# Patient Record
Sex: MALE | Race: BLACK OR AFRICAN AMERICAN | NOT HISPANIC OR LATINO | Employment: FULL TIME | URBAN - METROPOLITAN AREA
[De-identification: names, ages, dates, MRNs, and addresses within clinical notes are randomized per-mention and may not be internally consistent; named-entity substitution may affect disease eponyms.]

---

## 2019-01-02 ENCOUNTER — TRANSCRIBE ORDERS (OUTPATIENT)
Dept: REGISTRATION | Facility: HOSPITAL | Age: 64
End: 2019-01-02

## 2019-01-02 ENCOUNTER — HOSPITAL ENCOUNTER (OUTPATIENT)
Dept: RADIOLOGY | Facility: HOSPITAL | Age: 64
Discharge: HOME | End: 2019-01-02
Attending: OTOLARYNGOLOGY
Payer: COMMERCIAL

## 2019-01-02 ENCOUNTER — HOSPITAL ENCOUNTER (OUTPATIENT)
Dept: CARDIOLOGY | Facility: HOSPITAL | Age: 64
Discharge: HOME | End: 2019-01-02
Attending: OTOLARYNGOLOGY
Payer: COMMERCIAL

## 2019-01-02 DIAGNOSIS — J32.4 CHRONIC PANSINUSITIS: ICD-10-CM

## 2019-01-02 DIAGNOSIS — J32.4 CHRONIC PANSINUSITIS: Primary | ICD-10-CM

## 2019-01-02 DIAGNOSIS — J32.2 CHRONIC ETHMOIDAL SINUSITIS: ICD-10-CM

## 2019-01-02 PROCEDURE — 70486 CT MAXILLOFACIAL W/O DYE: CPT

## 2019-01-02 PROCEDURE — 93005 ELECTROCARDIOGRAM TRACING: CPT

## 2019-01-03 LAB
ATRIAL RATE: 77
P AXIS: 55
PR INTERVAL: 178
QRS DURATION: 140
QT INTERVAL: 424
QTC CALCULATION(BAZETT): 479
R AXIS: 37
T WAVE AXIS: 38
VENTRICULAR RATE: 77

## 2019-01-15 NOTE — H&P
General Surgery History and Physical      HPI     Patient is a 63 y.o. male who presents with a history of chronic sinus disease with polyps.  He had sinus surgery 20 years ago which was successful until 6 months he is unable to breathe has lost his sense of smell and has nasal obstruction.  He has had recurrent infections.  He has a history of allergic rhinitis and had immunotherapy in the past.  Exam shows severe polyps completely filling the nasal cavity on both sides.  Sinus CT scan show pansinusitis.    Medical History:   Past Medical History:   Diagnosis Date   • Acid reflux    • Arthritis     R wrist   • Back pain     Lumbar spine   • BMI 36.0-36.9,adult    • BPH (benign prostatic hyperplasia)    • Bronchitis    • Concussion    • Depression    • HNP (herniated nucleus pulposus), lumbar    • Hypertension    • Lipid disorder    • Pneumonia    • Snores    • Type 2 diabetes mellitus (CMS/HCC) ([facility])        Surgical History:   Past Surgical History:   Procedure Laterality Date   • ANKLE SURGERY Bilateral    • COLONOSCOPY     • SINUS SURGERY     • WISDOM TOOTH EXTRACTION         Social History:   Social History     Social History Narrative   • No narrative on file       Family History: No family history on file.    Allergies: No known allergies    Home Medications:      Current Medications:  •  amLODIPine  •  aspirin  •  atorvastatin  •  empagliflozin  •  glipiZIDE  •  metFORMIN  •  tamsulosin  •  valsartan-hydrochlorothiazide    Review of Systems   Constitutional: Negative for appetite change.   HENT: Negative for facial swelling.    Eyes: Negative for discharge.   Respiratory: Negative for stridor.    Cardiovascular: Negative for chest pain.   Gastrointestinal: Negative for diarrhea.   Genitourinary: Negative for difficulty urinating.   Skin: Negative for rash.   Neurological: Negative for speech difficulty.   Psychiatric/Behavioral: Negative for hallucinations.     Objective     Vital Signs for the last 24  hours:       Physical Exam:  There were no vitals taken for this visit.    General appearance: alert, appears stated age and cooperative  Head: normocephalic, without obvious abnormality, atraumatic  Ears: normal TM's and external ear canals both ears  Nose: External nose with no gross deformity. Nares normal. No nasal septal hematoma or perforation. Mucosa normal. No drainage or sinus tenderness.  Throat: lips, mucosa, and tongue normal; teeth and gums normal  Neck: no adenopathy, no carotid bruit, no JVD, supple, symmetrical, trachea midline and thyroid not enlarged, symmetric, no tenderness/mass/nodules  Lungs: no wheezing or stridor  Extremities: extremities normal, warm and well-perfused; no cyanosis, clubbing, or edema  Skin: Skin color, texture, turgor normal. No rashes or lesions  Lymph nodes: No Cervical Lymphadenopathy  Neurologic: Grossly normal      Labs  I have reviewed the patient's labs.  Current labs are within normal limits.    Imaging  Significant findings include: Chronic sinusitis    Assessment/Plan He is a 63-year-old male with chronic polypoid pansinusitis status post sinus surgery 20 years ago.  He is scheduled for endoscopic sinus surgery addressing all of the sinuses including the use of the fusion image guided system.  He is being pretreated with Bactrim.  He has diabetes.

## 2019-01-18 ENCOUNTER — ANESTHESIA EVENT (OUTPATIENT)
Dept: OPERATING ROOM | Facility: HOSPITAL | Age: 64
Setting detail: HOSPITAL OUTPATIENT SURGERY
End: 2019-01-18
Payer: COMMERCIAL

## 2019-01-21 ENCOUNTER — HOSPITAL ENCOUNTER (OUTPATIENT)
Facility: HOSPITAL | Age: 64
Setting detail: HOSPITAL OUTPATIENT SURGERY
Discharge: HOME | End: 2019-01-21
Attending: OTOLARYNGOLOGY | Admitting: OTOLARYNGOLOGY
Payer: COMMERCIAL

## 2019-01-21 ENCOUNTER — ANESTHESIA (OUTPATIENT)
Dept: OPERATING ROOM | Facility: HOSPITAL | Age: 64
Setting detail: HOSPITAL OUTPATIENT SURGERY
End: 2019-01-21
Payer: COMMERCIAL

## 2019-01-21 VITALS
OXYGEN SATURATION: 98 % | HEIGHT: 69 IN | WEIGHT: 250 LBS | TEMPERATURE: 98.1 F | SYSTOLIC BLOOD PRESSURE: 148 MMHG | BODY MASS INDEX: 37.03 KG/M2 | HEART RATE: 98 BPM | RESPIRATION RATE: 18 BRPM | DIASTOLIC BLOOD PRESSURE: 78 MMHG

## 2019-01-21 LAB
GLUCOSE BLD-MCNC: 91 MG/DL (ref 70–99)
POCT TEST: NORMAL

## 2019-01-21 PROCEDURE — 0NBG4ZZ EXCISION OF LEFT ETHMOID BONE, PERCUTANEOUS ENDOSCOPIC APPROACH: ICD-10-PCS | Performed by: OTOLARYNGOLOGY

## 2019-01-21 PROCEDURE — 0NBF4ZZ EXCISION OF RIGHT ETHMOID BONE, PERCUTANEOUS ENDOSCOPIC APPROACH: ICD-10-PCS | Performed by: OTOLARYNGOLOGY

## 2019-01-21 PROCEDURE — 25800000 HC PHARMACY IV SOLUTIONS: Performed by: OTOLARYNGOLOGY

## 2019-01-21 PROCEDURE — 63600000 HC DRUGS/DETAIL CODE: Performed by: NURSE ANESTHETIST, CERTIFIED REGISTERED

## 2019-01-21 PROCEDURE — 25000000 HC PHARMACY GENERAL: Performed by: OTOLARYNGOLOGY

## 2019-01-21 PROCEDURE — 71000002 HC PACU PHASE 2 INITIAL 30MIN: Performed by: OTOLARYNGOLOGY

## 2019-01-21 PROCEDURE — 37000001 HC ANESTHESIA GENERAL: Performed by: OTOLARYNGOLOGY

## 2019-01-21 PROCEDURE — 099U4ZZ DRAINAGE OF RIGHT ETHMOID SINUS, PERCUTANEOUS ENDOSCOPIC APPROACH: ICD-10-PCS | Performed by: OTOLARYNGOLOGY

## 2019-01-21 PROCEDURE — 71000011 HC PACU PHASE 1 EA ADDL MIN: Performed by: OTOLARYNGOLOGY

## 2019-01-21 PROCEDURE — 63600000 HC DRUGS/DETAIL CODE: Performed by: OTOLARYNGOLOGY

## 2019-01-21 PROCEDURE — 71000012 HC PACU PHASE 2 EA ADDL MIN: Performed by: OTOLARYNGOLOGY

## 2019-01-21 PROCEDURE — 36000014 HC OR LEVEL 4 EA ADDL MIN: Performed by: OTOLARYNGOLOGY

## 2019-01-21 PROCEDURE — 71000001 HC PACU PHASE 1 INITIAL 30MIN: Performed by: OTOLARYNGOLOGY

## 2019-01-21 PROCEDURE — 63700000 HC SELF-ADMINISTRABLE DRUG: Performed by: OTOLARYNGOLOGY

## 2019-01-21 PROCEDURE — 63600000 HC DRUGS/DETAIL CODE: Performed by: ANESTHESIOLOGY

## 2019-01-21 PROCEDURE — 09BS4ZZ EXCISION OF RIGHT FRONTAL SINUS, PERCUTANEOUS ENDOSCOPIC APPROACH: ICD-10-PCS | Performed by: OTOLARYNGOLOGY

## 2019-01-21 PROCEDURE — 09BW8ZZ EXCISION OF RIGHT SPHENOID SINUS, VIA NATURAL OR ARTIFICIAL OPENING ENDOSCOPIC: ICD-10-PCS | Performed by: OTOLARYNGOLOGY

## 2019-01-21 PROCEDURE — 09BT4ZZ EXCISION OF LEFT FRONTAL SINUS, PERCUTANEOUS ENDOSCOPIC APPROACH: ICD-10-PCS | Performed by: OTOLARYNGOLOGY

## 2019-01-21 PROCEDURE — 099V4ZZ DRAINAGE OF LEFT ETHMOID SINUS, PERCUTANEOUS ENDOSCOPIC APPROACH: ICD-10-PCS | Performed by: OTOLARYNGOLOGY

## 2019-01-21 PROCEDURE — 27200000 HC STERILE SUPPLY: Performed by: OTOLARYNGOLOGY

## 2019-01-21 PROCEDURE — 36000004 HC OR LEVEL 4 INITIAL 30MIN: Performed by: OTOLARYNGOLOGY

## 2019-01-21 PROCEDURE — 8E09XBZ COMPUTER ASSISTED PROCEDURE OF HEAD AND NECK REGION: ICD-10-PCS | Performed by: OTOLARYNGOLOGY

## 2019-01-21 PROCEDURE — 25000000 HC PHARMACY GENERAL: Performed by: NURSE ANESTHETIST, CERTIFIED REGISTERED

## 2019-01-21 RX ORDER — PROPOFOL 10 MG/ML
INJECTION, EMULSION INTRAVENOUS AS NEEDED
Status: DISCONTINUED | OUTPATIENT
Start: 2019-01-21 | End: 2019-01-21 | Stop reason: SURG

## 2019-01-21 RX ORDER — ONDANSETRON HYDROCHLORIDE 2 MG/ML
INJECTION, SOLUTION INTRAVENOUS AS NEEDED
Status: DISCONTINUED | OUTPATIENT
Start: 2019-01-21 | End: 2019-01-21 | Stop reason: SURG

## 2019-01-21 RX ORDER — HYDRALAZINE HYDROCHLORIDE 20 MG/ML
INJECTION INTRAMUSCULAR; INTRAVENOUS AS NEEDED
Status: DISCONTINUED | OUTPATIENT
Start: 2019-01-21 | End: 2019-01-21 | Stop reason: SURG

## 2019-01-21 RX ORDER — NEOSTIGMINE METHYLSULFATE 1 MG/ML
INJECTION INTRAVENOUS AS NEEDED
Status: DISCONTINUED | OUTPATIENT
Start: 2019-01-21 | End: 2019-01-21 | Stop reason: SURG

## 2019-01-21 RX ORDER — LIDOCAINE HYDROCHLORIDE AND EPINEPHRINE 10; 10 UG/ML; MG/ML
INJECTION, SOLUTION INFILTRATION; PERINEURAL AS NEEDED
Status: DISCONTINUED | OUTPATIENT
Start: 2019-01-21 | End: 2019-01-21 | Stop reason: HOSPADM

## 2019-01-21 RX ORDER — HYDROMORPHONE HYDROCHLORIDE 1 MG/ML
0.5 INJECTION, SOLUTION INTRAMUSCULAR; INTRAVENOUS; SUBCUTANEOUS
Status: DISCONTINUED | OUTPATIENT
Start: 2019-01-21 | End: 2019-01-21 | Stop reason: HOSPADM

## 2019-01-21 RX ORDER — LIDOCAINE HYDROCHLORIDE 10 MG/ML
INJECTION, SOLUTION EPIDURAL; INFILTRATION; INTRACAUDAL; PERINEURAL AS NEEDED
Status: DISCONTINUED | OUTPATIENT
Start: 2019-01-21 | End: 2019-01-21 | Stop reason: SURG

## 2019-01-21 RX ORDER — DEXAMETHASONE SODIUM PHOSPHATE 4 MG/ML
INJECTION, SOLUTION INTRA-ARTICULAR; INTRALESIONAL; INTRAMUSCULAR; INTRAVENOUS; SOFT TISSUE AS NEEDED
Status: DISCONTINUED | OUTPATIENT
Start: 2019-01-21 | End: 2019-01-21 | Stop reason: SURG

## 2019-01-21 RX ORDER — ONDANSETRON HYDROCHLORIDE 2 MG/ML
4 INJECTION, SOLUTION INTRAVENOUS EVERY 6 HOURS PRN
Status: CANCELLED | OUTPATIENT
Start: 2019-01-21

## 2019-01-21 RX ORDER — OXYCODONE AND ACETAMINOPHEN 5; 325 MG/1; MG/1
1-2 TABLET ORAL EVERY 4 HOURS PRN
Status: CANCELLED | OUTPATIENT
Start: 2019-01-21

## 2019-01-21 RX ORDER — ROCURONIUM BROMIDE 50 MG/5 ML
SYRINGE (ML) INTRAVENOUS AS NEEDED
Status: DISCONTINUED | OUTPATIENT
Start: 2019-01-21 | End: 2019-01-21 | Stop reason: SURG

## 2019-01-21 RX ORDER — MUPIROCIN 20 MG/G
OINTMENT TOPICAL AS NEEDED
Status: DISCONTINUED | OUTPATIENT
Start: 2019-01-21 | End: 2019-01-21 | Stop reason: HOSPADM

## 2019-01-21 RX ORDER — PHENYLEPHRINE HCL IN 0.9% NACL 1 MG/10 ML
SYRINGE (ML) INTRAVENOUS AS NEEDED
Status: DISCONTINUED | OUTPATIENT
Start: 2019-01-21 | End: 2019-01-21 | Stop reason: SURG

## 2019-01-21 RX ORDER — SODIUM CHLORIDE 9 MG/ML
1000 INJECTION, SOLUTION INTRAVENOUS CONTINUOUS
Status: DISCONTINUED | OUTPATIENT
Start: 2019-01-21 | End: 2019-01-21 | Stop reason: HOSPADM

## 2019-01-21 RX ORDER — ONDANSETRON HYDROCHLORIDE 2 MG/ML
4 INJECTION, SOLUTION INTRAVENOUS
Status: DISCONTINUED | OUTPATIENT
Start: 2019-01-21 | End: 2019-01-21 | Stop reason: HOSPADM

## 2019-01-21 RX ORDER — MIDAZOLAM HYDROCHLORIDE 2 MG/2ML
INJECTION, SOLUTION INTRAMUSCULAR; INTRAVENOUS AS NEEDED
Status: DISCONTINUED | OUTPATIENT
Start: 2019-01-21 | End: 2019-01-21 | Stop reason: SURG

## 2019-01-21 RX ORDER — OXYMETAZOLINE HCL 0.05 %
SPRAY, NON-AEROSOL (ML) NASAL AS NEEDED
Status: DISCONTINUED | OUTPATIENT
Start: 2019-01-21 | End: 2019-01-21 | Stop reason: HOSPADM

## 2019-01-21 RX ORDER — GLYCOPYRROLATE 0.6MG/3ML
SYRINGE (ML) INTRAVENOUS AS NEEDED
Status: DISCONTINUED | OUTPATIENT
Start: 2019-01-21 | End: 2019-01-21 | Stop reason: SURG

## 2019-01-21 RX ORDER — OXYMETAZOLINE HCL 0.05 %
2 SPRAY, NON-AEROSOL (ML) NASAL
Status: CANCELLED | OUTPATIENT
Start: 2019-01-21 | End: 2019-01-24

## 2019-01-21 RX ORDER — EPINEPHRINE 1 MG/ML
INJECTION, SOLUTION INTRAMUSCULAR; SUBCUTANEOUS AS NEEDED
Status: DISCONTINUED | OUTPATIENT
Start: 2019-01-21 | End: 2019-01-21 | Stop reason: HOSPADM

## 2019-01-21 RX ORDER — CEFAZOLIN SODIUM/WATER 2 G/20 ML
2 SYRINGE (ML) INTRAVENOUS
Status: COMPLETED | OUTPATIENT
Start: 2019-01-21 | End: 2019-01-21

## 2019-01-21 RX ORDER — FENTANYL CITRATE 50 UG/ML
50 INJECTION, SOLUTION INTRAMUSCULAR; INTRAVENOUS
Status: DISCONTINUED | OUTPATIENT
Start: 2019-01-21 | End: 2019-01-21 | Stop reason: HOSPADM

## 2019-01-21 RX ORDER — OXYMETAZOLINE HCL 0.05 %
2 SPRAY, NON-AEROSOL (ML) NASAL
Status: COMPLETED | OUTPATIENT
Start: 2019-01-21 | End: 2019-01-21

## 2019-01-21 RX ORDER — FENTANYL CITRATE 50 UG/ML
INJECTION, SOLUTION INTRAMUSCULAR; INTRAVENOUS AS NEEDED
Status: DISCONTINUED | OUTPATIENT
Start: 2019-01-21 | End: 2019-01-21 | Stop reason: SURG

## 2019-01-21 RX ORDER — METOPROLOL TARTRATE 1 MG/ML
INJECTION, SOLUTION INTRAVENOUS AS NEEDED
Status: DISCONTINUED | OUTPATIENT
Start: 2019-01-21 | End: 2019-01-21 | Stop reason: SURG

## 2019-01-21 RX ADMIN — Medication 100 MCG: at 12:28

## 2019-01-21 RX ADMIN — Medication 2 G: at 11:56

## 2019-01-21 RX ADMIN — FENTANYL CITRATE 50 MCG: 50 INJECTION INTRAMUSCULAR; INTRAVENOUS at 12:11

## 2019-01-21 RX ADMIN — FENTANYL CITRATE 50 MCG: 50 INJECTION, SOLUTION INTRAMUSCULAR; INTRAVENOUS at 13:50

## 2019-01-21 RX ADMIN — FENTANYL CITRATE 50 MCG: 50 INJECTION INTRAMUSCULAR; INTRAVENOUS at 12:01

## 2019-01-21 RX ADMIN — SODIUM CHLORIDE 1000 ML: 9 INJECTION, SOLUTION INTRAVENOUS at 11:11

## 2019-01-21 RX ADMIN — NEOSTIGMINE METHYLSULFATE 5 MG: 1 INJECTION INTRAVENOUS at 13:25

## 2019-01-21 RX ADMIN — FENTANYL CITRATE 50 MCG: 50 INJECTION, SOLUTION INTRAMUSCULAR; INTRAVENOUS at 14:06

## 2019-01-21 RX ADMIN — PROPOFOL 200 MG: 10 INJECTION, EMULSION INTRAVENOUS at 12:01

## 2019-01-21 RX ADMIN — HYDRALAZINE HYDROCHLORIDE 5 MG: 20 INJECTION INTRAMUSCULAR; INTRAVENOUS at 13:18

## 2019-01-21 RX ADMIN — HYDRALAZINE HYDROCHLORIDE 5 MG: 20 INJECTION INTRAMUSCULAR; INTRAVENOUS at 13:37

## 2019-01-21 RX ADMIN — GLYCOPYRROLATE 0.8 MG: 0.2 INJECTION, SOLUTION INTRAMUSCULAR; INTRAVENOUS at 13:25

## 2019-01-21 RX ADMIN — ONDANSETRON 4 MG: 2 INJECTION INTRAMUSCULAR; INTRAVENOUS at 13:22

## 2019-01-21 RX ADMIN — Medication 40 MG: at 12:01

## 2019-01-21 RX ADMIN — HYDRALAZINE HYDROCHLORIDE 5 MG: 20 INJECTION INTRAMUSCULAR; INTRAVENOUS at 13:27

## 2019-01-21 RX ADMIN — MIDAZOLAM HYDROCHLORIDE 2 MG: 1 INJECTION, SOLUTION INTRAMUSCULAR; INTRAVENOUS at 11:53

## 2019-01-21 RX ADMIN — FENTANYL CITRATE 50 MCG: 50 INJECTION INTRAMUSCULAR; INTRAVENOUS at 13:40

## 2019-01-21 RX ADMIN — METOPROLOL TARTRATE 2.5 MG: 5 INJECTION, SOLUTION INTRAVENOUS at 12:21

## 2019-01-21 RX ADMIN — ONDANSETRON 4 MG: 2 INJECTION INTRAMUSCULAR; INTRAVENOUS at 11:59

## 2019-01-21 RX ADMIN — DEXAMETHASONE SODIUM PHOSPHATE 10 MG: 4 INJECTION, SOLUTION INTRAMUSCULAR; INTRAVENOUS at 12:13

## 2019-01-21 RX ADMIN — OXYMETAZOLINE HYDROCHLORIDE 2 SPRAY: 5 SPRAY NASAL at 11:18

## 2019-01-21 RX ADMIN — LIDOCAINE HYDROCHLORIDE 5 ML: 10 INJECTION, SOLUTION EPIDURAL; INFILTRATION; INTRACAUDAL; PERINEURAL at 12:01

## 2019-01-21 RX ADMIN — Medication 100 MCG: at 12:25

## 2019-01-21 RX ADMIN — Medication 10 MG: at 12:13

## 2019-01-21 RX ADMIN — METOPROLOL TARTRATE 2.5 MG: 5 INJECTION, SOLUTION INTRAVENOUS at 13:45

## 2019-01-21 RX ADMIN — FENTANYL CITRATE 50 MCG: 50 INJECTION INTRAMUSCULAR; INTRAVENOUS at 12:19

## 2019-01-21 ASSESSMENT — PAIN - FUNCTIONAL ASSESSMENT
PAIN_FUNCTIONAL_ASSESSMENT: NO/DENIES PAIN
PAIN_FUNCTIONAL_ASSESSMENT: 0-10

## 2019-01-21 ASSESSMENT — LIFESTYLE VARIABLES: TOBACCO_USE: 1

## 2019-01-21 NOTE — ANESTHESIA PROCEDURE NOTES
Airway  Urgency: elective    Start Time: 1/21/2019 12:05 PM  Stop Time: 1/21/2019 12:06 PM    Airway not difficult    General Information and Staff    Patient location during procedure: OR  Anesthesiologist: SHRUTHI BANSAL  Resident/CRNA: WILLIAM BATES  Performed: resident/CRNA     Indications and Patient Condition  Indications for airway management: anesthesia  Sedation level: general  Preoxygenated: yes  Patient position: sniffing  MILS not maintained throughout  Mask difficulty assessment: 2 - vent by mask + OA or adjuvant +/- NMBA (90 mm oral airway)    Final Airway Details  Final airway type: endotracheal airway      Successful airway: ETT and SCHUYLER tube  Cuffed: yes   Successful intubation technique: video laryngoscopy  Facilitating devices/methods: intubating stylet  Endotracheal tube insertion site: oral  Blade: Cameron  Blade size: #4  ETT size: 8.0 mm  Cormack-Lehane Classification: grade I - full view of glottis  Placement verified by: chest auscultation and capnometry   Measured from: lips  Number of attempts at approach: 2  Ventilation between attempts: BVM  Number of other approaches attempted: 0  Atraumatic airway insertion      Additional Comments  Attempt #1 with MAC 3 glidescope blade, unable to properly visualize VC or advance ETT.  Switch to MAC 4.

## 2019-01-21 NOTE — OP NOTE
Patient Name:  Neptali Berkowitz  Medical Record Number:  [id number]  YOB: 1955   Date of Operation:  1/21/2019  Primary Surgeon:  Kristian Alexis MD  Anesthesia: General  Anesthesiologist: Anesthesiologist: Krystyna Pantoja MD  CRNA: Yumiko Bray CRNA     Pre Op Diagnosis: Chronic Pansinusitis    Post Op Diagnosis: Chronic Pansinusitis    Procedure:  1. Bilateral Maxillary antrostomy  2. Bilateral Total Ethmoidectomy  3. Bilateral Frontal Sinusotomy  4.Bilateral Sphenoid Sinusotomy  5. Steriotactic Image Guidance (extracranial)    Complications: None    Blood Loss:200cc    Specimen:No specimens collected during this procedure.    Findings:massive polyps throughout nose and all sinuses    Procedure:  The patient was brought to the operating room and identified, prepped and draped in a sterile fashion.  Afrin pledgets were placed intranasally.         The lateral nasal wall was injected with approximately 4 cc of 1%lidocaine with 1/100,000 epinephrine, at the insertion point of the middle turbinate anteriorly.  Additional injection was performed posteriorly in the area of the sphenopalatine artery.  Afrin soaked pledgets were placed in the bilateral nasal cavity.    IMAGE GUIDANCE: The [employer]chette image guidance system was calibrated to the previously obtained CT scan    Right Maxillary Antrostomy: The uncinate process was resected using the codell. Through cutting and back biuting instruments were used to widen the maxillary antrostomy to include the natural sinus opening anteriorly.    Right Total Ethmoidectomy: The 4 mm shaver blade was used to go through the bulla ethmoidalis and retrobulbar cells.  The ground lamella was entered and the posterior ethmoid cells were opened up using the 4 mm shaver blade.  The lamina papyracea was delineated and preserved.  This was done until the anterior face of the sphenoid was reached.  Using the angled telescope the more superior ethmoid cells  were opened up with the up through-cutting forceps to delineate the fovea ethmoidalis.  This was done from posterior to anterior in the frontal recess was opened up in the similar fashion.    Right Frontal Sinusotomy: Using the 45 degree endoscope, the frontal recess was identified. The agger nasi cell was removed. The frontal sinus opening was widened using the curved microdebrider, the cobra forceps and the up punch forceps. This was confirmed using the image guidance probe.    Right Sphenoid Sinusotomy: The image guidance probe was used to identify the sphenoid sinus opening at its natural position posterior to the superior turbinate. The up punch forceps and the straight microdebrider were used to open the sphenoid sinus widely. The image guidance probe was used to confirm.    Left Maxillary Antrostomy: The uncinate process was resected using the codell. Through cutting and back biuting instruments were used to widen the maxillary antrostomy to include the natural sinus opening anteriorly.    Left Total Ethmoidectomy: 4 mm shaver blade was used to open up the bulla ethmoidalis and retrobulbar cells.  The mini shaver was used to go through the ground lamella and the posterior ethmoid cells were opened up in a similar fashion.  The lamina papyracea was delineated and preserved.  This was done to the anterior face of the sphenoid was reached.  Using an angled telescope more superior ethmoid cells were opened up with the up through cut forceps to delineate the fovea ethmoidalis.  This was done from posterior to anterior.  The frontal recess was opened up in a similar fashion.    Left Frontal Sinusotomy: Using the 45 degree endoscope, the frontal recess was identified. The agger nasi cell was removed. The frontal sinus opening was widened using the curved microdebrider, the cobra forceps and the up punch forceps.This was confirmed using the image guidance probe.    Left Sphenoid Sinusotomy: The image guidance probe  was used to identify the sphenoid sinus opening at its natural position posterior to the superior turbinate. The up punch forceps and the straight microdebrider were used to open the sphenoid sinus widely. The image guidance probe was used to confirm.              Prosper packs coated with Bactroban ointment were placed in the ethmoid cavity bilaterally.    There was no CSF or orbital fat encountered throughout the procedure.  There was no significant amount of bleeding.  The patient was then awoken from general anesthesia and returned to the recovery room in stable condition.    The [employer] fusion image guidance system was used throughout the case to identify bony landmarks and prevent complications.

## 2019-01-21 NOTE — DISCHARGE INSTRUCTIONS
General Anesthesia, Adult, Care After  These instructions provide you with information about caring for yourself after your procedure. Your health care provider may also give you more specific instructions. Your treatment has been planned according to current medical practices, but problems sometimes occur. Call your health care provider if you have any problems or questions after your procedure.  What can I expect after the procedure?  After the procedure, it is common to have:  · Vomiting.  · A sore throat.  · Mental slowness.  It is common to feel:  · Nauseous.  · Cold or shivery.  · Sleepy.  · Tired.  · Sore or achy, even in parts of your body where you did not have surgery.  Follow these instructions at home:  For at least 24 hours after the procedure:  · Do not:  ¨ Participate in activities where you could fall or become injured.  ¨ Drive.  ¨ Use heavy machinery.  ¨ Drink alcohol.  ¨ Take sleeping pills or medicines that cause drowsiness.  ¨ Make important decisions or sign legal documents.  ¨ Take care of children on your own.  · Rest.  Eating and drinking  · If you vomit, drink water, juice, or soup when you can drink without vomiting.  · Drink enough fluid to keep your urine clear or pale yellow.  · Make sure you have little or no nausea before eating solid foods.  · Follow the diet recommended by your health care provider.  General instructions  · Have a responsible adult stay with you until you are awake and alert.  · Return to your normal activities as told by your health care provider. Ask your health care provider what activities are safe for you.  · Take over-the-counter and prescription medicines only as told by your health care provider.  · If you smoke, do not smoke without supervision.  · Keep all follow-up visits as told by your health care provider. This is important.  Contact a health care provider if:  · You continue to have nausea or vomiting at home, and medicines are not helpful.  · You  cannot drink fluids or start eating again.  · You cannot urinate after 8-12 hours.  · You develop a skin rash.  · You have fever.  · You have increasing redness at the site of your procedure.  Get help right away if:  · You have difficulty breathing.  · You have chest pain.  · You have unexpected bleeding.  · You feel that you are having a life-threatening or urgent problem.  This information is not intended to replace advice given to you by your health care provider. Make sure you discuss any questions you have with your health care provider.  Document Released: 03/26/2002 Document Revised: 05/22/2017 Document Reviewed: 12/01/2016  [employer] Interactive Patient Education © 2018 [employer]        See attached post-op instruction sheet

## 2019-01-21 NOTE — ANESTHESIA POSTPROCEDURE EVALUATION
Patient: Neptali Berkowitz    Procedure Summary     Date:  01/21/19 Room / Location:   OR 1 / PH OR    Anesthesia Start:  1155 Anesthesia Stop:      Procedure:  FESS, FUSION PROTOCOL (Bilateral ) Diagnosis:       Ethmoid sinusitis, unspecified chronicity      (Chronic Sinusitis)    Surgeon:  Kristian Alexis MD Responsible Provider:  Krystyna Pantoja MD    Anesthesia Type:  general ASA Status:  3          Anesthesia Type: general  PACU Vitals  1/21/2019 1339 - 1/21/2019 1405      1/21/2019 1345 1/21/2019 1347 1/21/2019 1400       BP: (!)  150/80 (!)  159/90 (!)  145/82     Temp: - 36.5 °C (97.7 °F) -     Pulse: (!)  102 92 92     Resp: 19 14 15     SpO2: 95 % - 96 %             Anesthesia Post Evaluation    Pain management: adequate  Patient location during evaluation: PACU  Patient participation: complete - patient participated  Level of consciousness: awake and alert  Cardiovascular status: acceptable  Airway Patency: adequate  Respiratory status: acceptable  Hydration status: acceptable  Anesthetic complications: no

## 2021-04-14 DIAGNOSIS — Z23 ENCOUNTER FOR IMMUNIZATION: ICD-10-CM

## 2021-10-05 ENCOUNTER — TRANSCRIBE ORDERS (OUTPATIENT)
Dept: SCHEDULING | Age: 66
End: 2021-10-05
Payer: COMMERCIAL

## 2021-10-05 DIAGNOSIS — E05.90 THYROTOXICOSIS, UNSPECIFIED WITHOUT THYROTOXIC CRISIS OR STORM: Primary | ICD-10-CM

## 2021-10-06 ENCOUNTER — HOSPITAL ENCOUNTER (OUTPATIENT)
Dept: RADIOLOGY | Facility: HOSPITAL | Age: 66
Discharge: HOME | End: 2021-10-06
Attending: NURSE PRACTITIONER
Payer: COMMERCIAL

## 2021-10-06 DIAGNOSIS — E05.90 THYROTOXICOSIS, UNSPECIFIED WITHOUT THYROTOXIC CRISIS OR STORM: ICD-10-CM

## 2021-10-06 PROCEDURE — 76536 US EXAM OF HEAD AND NECK: CPT

## 2022-10-06 ENCOUNTER — HOSPITAL ENCOUNTER (OUTPATIENT)
Dept: RADIOLOGY | Age: 67
Discharge: HOME | End: 2022-10-06
Attending: FAMILY MEDICINE
Payer: COMMERCIAL

## 2022-10-06 ENCOUNTER — TRANSCRIBE ORDERS (OUTPATIENT)
Dept: SCHEDULING | Age: 67
End: 2022-10-06

## 2022-10-06 DIAGNOSIS — M54.16 RADICULOPATHY, LUMBAR REGION: Primary | ICD-10-CM

## 2022-10-06 DIAGNOSIS — M54.16 RADICULOPATHY, LUMBAR REGION: ICD-10-CM

## 2022-10-06 PROCEDURE — 72110 X-RAY EXAM L-2 SPINE 4/>VWS: CPT

## 2022-10-06 PROCEDURE — 72202 X-RAY EXAM SI JOINTS 3/> VWS: CPT

## 2022-10-14 ENCOUNTER — HOSPITAL ENCOUNTER (OUTPATIENT)
Dept: RADIOLOGY | Facility: HOSPITAL | Age: 67
Discharge: HOME | End: 2022-10-14
Attending: FAMILY MEDICINE
Payer: COMMERCIAL

## 2022-10-14 DIAGNOSIS — M54.16 RADICULOPATHY, LUMBAR REGION: ICD-10-CM

## 2023-01-31 ENCOUNTER — TRANSCRIBE ORDERS (OUTPATIENT)
Dept: SCHEDULING | Age: 68
End: 2023-01-31

## 2023-01-31 DIAGNOSIS — H90.42 SENSORINEURAL HEARING LOSS, UNILATERAL, LEFT EAR, WITH UNRESTRICTED HEARING ON THE CONTRALATERAL SIDE: Primary | ICD-10-CM

## 2023-02-04 ENCOUNTER — HOSPITAL ENCOUNTER (OUTPATIENT)
Dept: RADIOLOGY | Facility: HOSPITAL | Age: 68
Discharge: HOME | End: 2023-02-04
Attending: PHYSICIAN ASSISTANT
Payer: COMMERCIAL

## 2023-02-04 DIAGNOSIS — H90.42 SENSORINEURAL HEARING LOSS, UNILATERAL, LEFT EAR, WITH UNRESTRICTED HEARING ON THE CONTRALATERAL SIDE: ICD-10-CM

## 2023-02-04 RX ORDER — GADOBUTROL 604.72 MG/ML
10 INJECTION INTRAVENOUS ONCE
Status: COMPLETED | OUTPATIENT
Start: 2023-02-04 | End: 2023-02-04

## 2023-02-04 RX ADMIN — GADOBUTROL 10 ML: 604.72 INJECTION INTRAVENOUS at 14:37

## 2023-06-02 ENCOUNTER — TRANSCRIBE ORDERS (OUTPATIENT)
Dept: SCHEDULING | Age: 68
End: 2023-06-02

## 2023-06-02 DIAGNOSIS — S83.207A UNSPECIFIED TEAR OF UNSPECIFIED MENISCUS, CURRENT INJURY, LEFT KNEE, INITIAL ENCOUNTER: Primary | ICD-10-CM

## 2023-06-13 ENCOUNTER — HOSPITAL ENCOUNTER (OUTPATIENT)
Dept: RADIOLOGY | Facility: HOSPITAL | Age: 68
Discharge: HOME | End: 2023-06-13
Attending: SPECIALIST
Payer: COMMERCIAL

## 2023-06-13 DIAGNOSIS — S83.207A UNSPECIFIED TEAR OF UNSPECIFIED MENISCUS, CURRENT INJURY, LEFT KNEE, INITIAL ENCOUNTER: ICD-10-CM

## (undated) DEVICE — Device

## (undated) DEVICE — ***USE 56887*** SUTURE CHROMIC GUT 4-0 U203H

## (undated) DEVICE — TRACKER INSTRUMENT

## (undated) DEVICE — BLADE M4 INF TURBINATE 2MM

## (undated) DEVICE — TUBE SUCTION 1/4INX20FT STERILE

## (undated) DEVICE — BLADE SHAVER RAD 60 EXTREME

## (undated) DEVICE — ***USE 149534*** FOG-OUT ANTI-FOG MEDC

## (undated) DEVICE — PACK FACIAL PMH

## (undated) DEVICE — SOLN IRRIG STER WATER 1000ML

## (undated) DEVICE — MANIFOLD FOUR PORT NEPTUNE

## (undated) DEVICE — NEEDLE SPINAL 25G X 3-1/2IN

## (undated) DEVICE — STENT SINUS LAMANITED KENNEDY

## (undated) DEVICE — TRACKER PATIENT NON-INVASIVE AXIEM

## (undated) DEVICE — BLADE M4  4MM TRICUT

## (undated) DEVICE — ***USE 57698*** SLEEVE FLOWTRON DVT CALF SINGLE USE

## (undated) DEVICE — SOLN IRRIG .9%SOD 500ML

## (undated) DEVICE — SOLN IV NSS 0.9% 250ML

## (undated) DEVICE — COTTONOIDS 1/2X3

## (undated) DEVICE — SOLN IV NSS 0.9% 150ML

## (undated) DEVICE — MANIFOLD SINGLE PORT NEPTUNE

## (undated) DEVICE — GLOVE SZ 7.5 PROTEXIS PI